# Patient Record
Sex: MALE | Race: WHITE | NOT HISPANIC OR LATINO | URBAN - METROPOLITAN AREA
[De-identification: names, ages, dates, MRNs, and addresses within clinical notes are randomized per-mention and may not be internally consistent; named-entity substitution may affect disease eponyms.]

---

## 2020-11-03 ENCOUNTER — OFFICE (OUTPATIENT)
Dept: URBAN - METROPOLITAN AREA CLINIC 12 | Facility: CLINIC | Age: 55
End: 2020-11-03

## 2020-11-03 VITALS
OXYGEN SATURATION: 95 % | SYSTOLIC BLOOD PRESSURE: 146 MMHG | WEIGHT: 315 LBS | HEIGHT: 71 IN | DIASTOLIC BLOOD PRESSURE: 88 MMHG | HEART RATE: 77 BPM

## 2020-11-03 DIAGNOSIS — B18.1 CHRONIC VIRAL HEPATITIS B WITHOUT DELTA-AGENT: ICD-10-CM

## 2020-11-03 LAB
AFP, SERUM, TUMOR MARKER: 1.8 NG/ML (ref 0–8.3)
CBC, PLATELET, NO DIFFERENTIAL: HEMATOCRIT: 42.7 % (ref 37.5–51)
CBC, PLATELET, NO DIFFERENTIAL: HEMOGLOBIN: 14.3 G/DL (ref 13–17.7)
CBC, PLATELET, NO DIFFERENTIAL: MCH: 30.6 PG (ref 26.6–33)
CBC, PLATELET, NO DIFFERENTIAL: MCHC: 33.5 G/DL (ref 31.5–35.7)
CBC, PLATELET, NO DIFFERENTIAL: MCV: 91 FL (ref 79–97)
CBC, PLATELET, NO DIFFERENTIAL: PLATELETS: 270 X10E3/UL (ref 150–450)
CBC, PLATELET, NO DIFFERENTIAL: RBC: 4.68 X10E6/UL (ref 4.14–5.8)
CBC, PLATELET, NO DIFFERENTIAL: RDW: 12.6 % (ref 11.6–15.4)
CBC, PLATELET, NO DIFFERENTIAL: WBC: 6.4 X10E3/UL (ref 3.4–10.8)
COMP. METABOLIC PANEL (14): A/G RATIO: 1.8 (ref 1.2–2.2)
COMP. METABOLIC PANEL (14): ALBUMIN: 4.6 G/DL (ref 3.8–4.9)
COMP. METABOLIC PANEL (14): ALKALINE PHOSPHATASE: 70 IU/L (ref 39–117)
COMP. METABOLIC PANEL (14): ALT (SGPT): 26 IU/L (ref 0–44)
COMP. METABOLIC PANEL (14): AST (SGOT): 21 IU/L (ref 0–40)
COMP. METABOLIC PANEL (14): BILIRUBIN, TOTAL: 0.3 MG/DL (ref 0–1.2)
COMP. METABOLIC PANEL (14): BUN/CREATININE RATIO: 16 (ref 9–20)
COMP. METABOLIC PANEL (14): BUN: 17 MG/DL (ref 6–24)
COMP. METABOLIC PANEL (14): CALCIUM: 9.5 MG/DL (ref 8.7–10.2)
COMP. METABOLIC PANEL (14): CARBON DIOXIDE, TOTAL: 24 MMOL/L (ref 20–29)
COMP. METABOLIC PANEL (14): CHLORIDE: 100 MMOL/L (ref 96–106)
COMP. METABOLIC PANEL (14): CREATININE: 1.08 MG/DL (ref 0.76–1.27)
COMP. METABOLIC PANEL (14): EGFR IF AFRICN AM: 89 ML/MIN/1.73 (ref 59–?)
COMP. METABOLIC PANEL (14): EGFR IF NONAFRICN AM: 77 ML/MIN/1.73 (ref 59–?)
COMP. METABOLIC PANEL (14): GLOBULIN, TOTAL: 2.5 G/DL (ref 1.5–4.5)
COMP. METABOLIC PANEL (14): GLUCOSE: 142 MG/DL — HIGH (ref 65–99)
COMP. METABOLIC PANEL (14): POTASSIUM: 4 MMOL/L (ref 3.5–5.2)
COMP. METABOLIC PANEL (14): PROTEIN, TOTAL: 7.1 G/DL (ref 6–8.5)
COMP. METABOLIC PANEL (14): SODIUM: 136 MMOL/L (ref 134–144)
HBSAG SCREEN: POSITIVE
HBV REAL-TIME PCR, QUANT: HBV IU/ML: 30 IU/ML
HBV REAL-TIME PCR, QUANT: LOG10 HBV IU/ML: 1.48 LOG10 IU/ML
HBV REAL-TIME PCR, QUANT: TEST INFORMATION: (no result)
HCV ANTIBODY CASCADE(PCR/GENO): HCV AB: <0.1 S/CO RATIO
HCV FIBROSURE: ALPHA 2-MACROGLOBULINS, QN: 285 MG/DL — HIGH (ref 110–276)
HCV FIBROSURE: ALT (SGPT) P5P: 29 IU/L (ref 0–55)
HCV FIBROSURE: APOLIPOPROTEIN A-1: 118 MG/DL (ref 101–178)
HCV FIBROSURE: BILIRUBIN, TOTAL: 0.2 MG/DL (ref 0–1.2)
HCV FIBROSURE: COMMENT: (no result)
HCV FIBROSURE: FIBROSIS SCORE: 0.27 — HIGH (ref 0–0.21)
HCV FIBROSURE: FIBROSIS SCORING: (no result)
HCV FIBROSURE: FIBROSIS STAGE: (no result)
HCV FIBROSURE: GGT: 29 IU/L (ref 0–65)
HCV FIBROSURE: HAPTOGLOBIN: 182 MG/DL (ref 29–370)
HCV FIBROSURE: INTERPRETATIONS: (no result)
HCV FIBROSURE: LIMITATIONS: (no result)
HCV FIBROSURE: NECROINFLAMM ACTIVITY SCORING: (no result)
HCV FIBROSURE: NECROINFLAMMAT ACTIVITY GRADE: (no result)
HCV FIBROSURE: NECROINFLAMMAT ACTIVITY SCORE: 0.13 (ref 0–0.17)
HEP A AB, TOTAL: NEGATIVE
HEP B CORE AB, IGM: NEGATIVE
HEP B CORE AB, TOT: POSITIVE
HEP B SURFACE AB: HEP B SURFACE AB, QUAL: REACTIVE
HEP BE AB: POSITIVE
HEP BE AG: NEGATIVE
HIV AG/AB WITH REFLEX: HIV SCREEN 4TH GENERATION WRFX: NON REACTIVE
INTERPRETATION: (no result)
PROTHROMBIN TIME (PT): INR: 1 (ref 0.9–1.2)
PROTHROMBIN TIME (PT): PROTHROMBIN TIME: 10.9 SEC (ref 9.1–12)

## 2020-11-03 PROCEDURE — 99204 OFFICE O/P NEW MOD 45 MIN: CPT | Performed by: INTERNAL MEDICINE

## 2020-11-03 NOTE — SERVICEHPINOTES
Mr. Valdes is a 55-year-old man who is the brother of one of my other patients who is here for evaluation of hepatitis-B. His brother has hepatitis B and hepatitis C cirrhosis and his mother  of hepatitis B cirrhosis. The patient does not recall when he was diagnosed with the disease but it appears likely that this was many years ago when he was a child or possibly at birth. He has not had evaluation for this and does not take any medication for. He denies any issues with fevers, chills, nausea, vomiting, abdominal pain, itching, or jaundice. He denies any rectal bleeding or hematemesis. He denies any problems with confusion. He denies any issues with chest pain or significant dyspnea on exertion. He does see a cardiologist on a regular basis with negative evaluation earlier this year. he has never had a screening colonoscopy. He is considered average risk for colon cancer. Blood work from his PCP in 2019 revealed normal liver enzymes.

## 2020-11-03 NOTE — SERVICENOTES
The patient has a history of hepatitis B and so we did discuss treatment options along with possible side effects and risks.  I explained there is no current cure for HBV.  We will try to get him Vemlidy once workup has been completed.  We will need to check his hepatitis A and hepatitis C status as well as HIV status.  Of note, his mother has hepatitis B alone but his brother hepatitis-B and hepatitis-C.  Given the patient's large size fibroscan may not give an accurate evaluation of his liver fibrosis so we may need to consider liver biopsy unless his labs and imaging suggest that he has cirrhosis.  Given his large size he is also at risk for fatty liver and DUNN.

## 2020-11-06 LAB
AFP, SERUM, TUMOR MARKER: 1.8 NG/ML (ref 0–8.3)
CBC, PLATELET, NO DIFFERENTIAL: HEMATOCRIT: 42.7 % (ref 37.5–51)
CBC, PLATELET, NO DIFFERENTIAL: HEMOGLOBIN: 14.3 G/DL (ref 13–17.7)
CBC, PLATELET, NO DIFFERENTIAL: MCH: 30.6 PG (ref 26.6–33)
CBC, PLATELET, NO DIFFERENTIAL: MCHC: 33.5 G/DL (ref 31.5–35.7)
CBC, PLATELET, NO DIFFERENTIAL: MCV: 91 FL (ref 79–97)
CBC, PLATELET, NO DIFFERENTIAL: NRBC: (no result)
CBC, PLATELET, NO DIFFERENTIAL: PLATELETS: 270 X10E3/UL (ref 150–450)
CBC, PLATELET, NO DIFFERENTIAL: RBC: 4.68 X10E6/UL (ref 4.14–5.8)
CBC, PLATELET, NO DIFFERENTIAL: RDW: 12.6 % (ref 11.6–15.4)
CBC, PLATELET, NO DIFFERENTIAL: WBC: 6.4 X10E3/UL (ref 3.4–10.8)
COMP. METABOLIC PANEL (14): A/G RATIO: 1.8 (ref 1.2–2.2)
COMP. METABOLIC PANEL (14): ALBUMIN: 4.6 G/DL (ref 3.8–4.9)
COMP. METABOLIC PANEL (14): ALKALINE PHOSPHATASE: 70 IU/L (ref 39–117)
COMP. METABOLIC PANEL (14): ALT (SGPT): 26 IU/L (ref 0–44)
COMP. METABOLIC PANEL (14): AST (SGOT): 21 IU/L (ref 0–40)
COMP. METABOLIC PANEL (14): BILIRUBIN, TOTAL: 0.3 MG/DL (ref 0–1.2)
COMP. METABOLIC PANEL (14): BUN/CREATININE RATIO: 16 (ref 9–20)
COMP. METABOLIC PANEL (14): BUN: 17 MG/DL (ref 6–24)
COMP. METABOLIC PANEL (14): CALCIUM: 9.5 MG/DL (ref 8.7–10.2)
COMP. METABOLIC PANEL (14): CARBON DIOXIDE, TOTAL: 24 MMOL/L (ref 20–29)
COMP. METABOLIC PANEL (14): CHLORIDE: 100 MMOL/L (ref 96–106)
COMP. METABOLIC PANEL (14): CREATININE: 1.08 MG/DL (ref 0.76–1.27)
COMP. METABOLIC PANEL (14): EGFR IF AFRICN AM: 89 ML/MIN/1.73 (ref 59–?)
COMP. METABOLIC PANEL (14): EGFR IF NONAFRICN AM: 77 ML/MIN/1.73 (ref 59–?)
COMP. METABOLIC PANEL (14): GLOBULIN, TOTAL: 2.5 G/DL (ref 1.5–4.5)
COMP. METABOLIC PANEL (14): GLUCOSE: 142 MG/DL — HIGH (ref 65–99)
COMP. METABOLIC PANEL (14): POTASSIUM: 4 MMOL/L (ref 3.5–5.2)
COMP. METABOLIC PANEL (14): PROTEIN, TOTAL: 7.1 G/DL (ref 6–8.5)
COMP. METABOLIC PANEL (14): SODIUM: 136 MMOL/L (ref 134–144)
HBSAG SCREEN: POSITIVE
HBV REAL-TIME PCR, QUANT: HBV IU/ML: 30 IU/ML
HBV REAL-TIME PCR, QUANT: LOG10 HBV IU/ML: 1.48 LOG10 IU/ML
HBV REAL-TIME PCR, QUANT: TEST INFORMATION: (no result)
HCV ANTIBODY CASCADE(PCR/GENO): HCV AB: <0.1 S/CO RATIO
HCV FIBROSURE: ALPHA 2-MACROGLOBULINS, QN: 285 MG/DL — HIGH (ref 110–276)
HCV FIBROSURE: ALT (SGPT) P5P: 29 IU/L (ref 0–55)
HCV FIBROSURE: APOLIPOPROTEIN A-1: 118 MG/DL (ref 101–178)
HCV FIBROSURE: BILIRUBIN, TOTAL: 0.2 MG/DL (ref 0–1.2)
HCV FIBROSURE: COMMENT: (no result)
HCV FIBROSURE: FIBROSIS SCORE: 0.27 — HIGH (ref 0–0.21)
HCV FIBROSURE: FIBROSIS SCORING: (no result)
HCV FIBROSURE: FIBROSIS STAGE: (no result)
HCV FIBROSURE: GGT: 29 IU/L (ref 0–65)
HCV FIBROSURE: HAPTOGLOBIN: 182 MG/DL (ref 29–370)
HCV FIBROSURE: INTERPRETATIONS: (no result)
HCV FIBROSURE: LIMITATIONS: (no result)
HCV FIBROSURE: NECROINFLAMM ACTIVITY SCORING: (no result)
HCV FIBROSURE: NECROINFLAMMAT ACTIVITY GRADE: (no result)
HCV FIBROSURE: NECROINFLAMMAT ACTIVITY SCORE: 0.13 (ref 0–0.17)
HEP A AB, TOTAL: NEGATIVE
HEP B CORE AB, IGM: NEGATIVE
HEP B CORE AB, TOT: POSITIVE
HEP B SURFACE AB: HEP B SURFACE AB, QUAL: REACTIVE
HEP BE AB: POSITIVE
HEP BE AG: NEGATIVE
HIV AG/AB WITH REFLEX: HIV SCREEN 4TH GENERATION WRFX: NON REACTIVE
INTERPRETATION: (no result)
PROTHROMBIN TIME (PT): INR: 1 (ref 0.9–1.2)
PROTHROMBIN TIME (PT): PROTHROMBIN TIME: 10.9 SEC (ref 9.1–12)

## 2020-11-24 ENCOUNTER — AMBULATORY SURGICAL CENTER (OUTPATIENT)
Dept: URBAN - METROPOLITAN AREA SURGERY 3 | Facility: SURGERY | Age: 55
End: 2020-11-24
Payer: COMMERCIAL

## 2020-11-24 ENCOUNTER — OFFICE (OUTPATIENT)
Dept: URBAN - METROPOLITAN AREA PATHOLOGY 22 | Facility: PATHOLOGY | Age: 55
End: 2020-11-24
Payer: COMMERCIAL

## 2020-11-24 VITALS
RESPIRATION RATE: 17 BRPM | HEART RATE: 75 BPM | SYSTOLIC BLOOD PRESSURE: 138 MMHG | SYSTOLIC BLOOD PRESSURE: 129 MMHG | HEIGHT: 71 IN | SYSTOLIC BLOOD PRESSURE: 112 MMHG | HEART RATE: 88 BPM | HEART RATE: 75 BPM | HEART RATE: 74 BPM | OXYGEN SATURATION: 97 % | WEIGHT: 315 LBS | OXYGEN SATURATION: 96 % | RESPIRATION RATE: 17 BRPM | TEMPERATURE: 97.4 F | DIASTOLIC BLOOD PRESSURE: 74 MMHG | DIASTOLIC BLOOD PRESSURE: 94 MMHG | SYSTOLIC BLOOD PRESSURE: 127 MMHG | OXYGEN SATURATION: 93 % | DIASTOLIC BLOOD PRESSURE: 94 MMHG | SYSTOLIC BLOOD PRESSURE: 129 MMHG | DIASTOLIC BLOOD PRESSURE: 75 MMHG | HEART RATE: 71 BPM | DIASTOLIC BLOOD PRESSURE: 67 MMHG | SYSTOLIC BLOOD PRESSURE: 112 MMHG | OXYGEN SATURATION: 98 % | DIASTOLIC BLOOD PRESSURE: 87 MMHG | SYSTOLIC BLOOD PRESSURE: 169 MMHG | RESPIRATION RATE: 18 BRPM | RESPIRATION RATE: 24 BRPM | SYSTOLIC BLOOD PRESSURE: 169 MMHG | RESPIRATION RATE: 18 BRPM | OXYGEN SATURATION: 98 % | DIASTOLIC BLOOD PRESSURE: 74 MMHG | OXYGEN SATURATION: 96 % | OXYGEN SATURATION: 97 % | OXYGEN SATURATION: 100 % | DIASTOLIC BLOOD PRESSURE: 87 MMHG | DIASTOLIC BLOOD PRESSURE: 67 MMHG | DIASTOLIC BLOOD PRESSURE: 75 MMHG | OXYGEN SATURATION: 93 % | OXYGEN SATURATION: 100 % | RESPIRATION RATE: 24 BRPM | RESPIRATION RATE: 16 BRPM | HEART RATE: 74 BPM | SYSTOLIC BLOOD PRESSURE: 138 MMHG | SYSTOLIC BLOOD PRESSURE: 127 MMHG | WEIGHT: 315 LBS | TEMPERATURE: 97.4 F | HEIGHT: 71 IN | HEART RATE: 88 BPM | RESPIRATION RATE: 16 BRPM | HEART RATE: 71 BPM

## 2020-11-24 DIAGNOSIS — Z12.11 ENCOUNTER FOR SCREENING FOR MALIGNANT NEOPLASM OF COLON: ICD-10-CM

## 2020-11-24 DIAGNOSIS — K64.0 FIRST DEGREE HEMORRHOIDS: ICD-10-CM

## 2020-11-24 DIAGNOSIS — K57.30 DIVERTICULOSIS OF LARGE INTESTINE WITHOUT PERFORATION OR ABS: ICD-10-CM

## 2020-11-24 DIAGNOSIS — D12.2 BENIGN NEOPLASM OF ASCENDING COLON: ICD-10-CM

## 2020-11-24 DIAGNOSIS — K63.5 POLYP OF COLON: ICD-10-CM

## 2020-11-24 PROCEDURE — 88305 TISSUE EXAM BY PATHOLOGIST: CPT | Performed by: INTERNAL MEDICINE

## 2020-11-24 PROCEDURE — 45385 COLONOSCOPY W/LESION REMOVAL: CPT | Mod: 33 | Performed by: INTERNAL MEDICINE

## 2020-11-24 PROCEDURE — 45380 COLONOSCOPY AND BIOPSY: CPT | Mod: 59 | Performed by: INTERNAL MEDICINE

## 2020-12-11 ENCOUNTER — OFFICE (OUTPATIENT)
Dept: URBAN - METROPOLITAN AREA CLINIC 14 | Facility: CLINIC | Age: 55
End: 2020-12-11

## 2020-12-11 VITALS — HEIGHT: 71 IN

## 2020-12-11 DIAGNOSIS — B18.1 CHRONIC VIRAL HEPATITIS B WITHOUT DELTA-AGENT: ICD-10-CM

## 2020-12-11 PROCEDURE — 91200 LIVER ELASTOGRAPHY: CPT | Performed by: INTERNAL MEDICINE

## 2021-03-09 ENCOUNTER — OFFICE (OUTPATIENT)
Dept: URBAN - METROPOLITAN AREA CLINIC 12 | Facility: CLINIC | Age: 56
End: 2021-03-09

## 2021-03-09 VITALS
HEART RATE: 69 BPM | HEIGHT: 71 IN | WEIGHT: 315 LBS | DIASTOLIC BLOOD PRESSURE: 90 MMHG | SYSTOLIC BLOOD PRESSURE: 158 MMHG

## 2021-03-09 DIAGNOSIS — K63.5 POLYP OF COLON: ICD-10-CM

## 2021-03-09 DIAGNOSIS — B18.1 CHRONIC VIRAL HEPATITIS B WITHOUT DELTA-AGENT: ICD-10-CM

## 2021-03-09 LAB
AFP, SERUM, TUMOR MARKER: 1.9 NG/ML (ref 0–8.3)
CBC, PLATELET, NO DIFFERENTIAL: HEMATOCRIT: 42.8 % (ref 37.5–51)
CBC, PLATELET, NO DIFFERENTIAL: HEMOGLOBIN: 13.7 G/DL (ref 13–17.7)
CBC, PLATELET, NO DIFFERENTIAL: MCH: 29.8 PG (ref 26.6–33)
CBC, PLATELET, NO DIFFERENTIAL: MCHC: 32 G/DL (ref 31.5–35.7)
CBC, PLATELET, NO DIFFERENTIAL: MCV: 93 FL (ref 79–97)
CBC, PLATELET, NO DIFFERENTIAL: PLATELETS: 262 X10E3/UL (ref 150–450)
CBC, PLATELET, NO DIFFERENTIAL: RBC: 4.6 X10E6/UL (ref 4.14–5.8)
CBC, PLATELET, NO DIFFERENTIAL: RDW: 13.2 % (ref 11.6–15.4)
CBC, PLATELET, NO DIFFERENTIAL: WBC: 6 X10E3/UL (ref 3.4–10.8)
COMP. METABOLIC PANEL (14): A/G RATIO: 2.2 (ref 1.2–2.2)
COMP. METABOLIC PANEL (14): ALBUMIN: 4.9 G/DL (ref 3.8–4.9)
COMP. METABOLIC PANEL (14): ALKALINE PHOSPHATASE: 71 IU/L (ref 39–117)
COMP. METABOLIC PANEL (14): ALT (SGPT): 34 IU/L (ref 0–44)
COMP. METABOLIC PANEL (14): AST (SGOT): 24 IU/L (ref 0–40)
COMP. METABOLIC PANEL (14): BILIRUBIN, TOTAL: 0.2 MG/DL (ref 0–1.2)
COMP. METABOLIC PANEL (14): BUN/CREATININE RATIO: 17 (ref 9–20)
COMP. METABOLIC PANEL (14): BUN: 18 MG/DL (ref 6–24)
COMP. METABOLIC PANEL (14): CALCIUM: 9.6 MG/DL (ref 8.7–10.2)
COMP. METABOLIC PANEL (14): CARBON DIOXIDE, TOTAL: 26 MMOL/L (ref 20–29)
COMP. METABOLIC PANEL (14): CHLORIDE: 99 MMOL/L (ref 96–106)
COMP. METABOLIC PANEL (14): CREATININE: 1.09 MG/DL (ref 0.76–1.27)
COMP. METABOLIC PANEL (14): EGFR IF AFRICN AM: 88 ML/MIN/1.73 (ref 59–?)
COMP. METABOLIC PANEL (14): EGFR IF NONAFRICN AM: 77 ML/MIN/1.73 (ref 59–?)
COMP. METABOLIC PANEL (14): GLOBULIN, TOTAL: 2.2 G/DL (ref 1.5–4.5)
COMP. METABOLIC PANEL (14): GLUCOSE: 141 MG/DL — HIGH (ref 65–99)
COMP. METABOLIC PANEL (14): POTASSIUM: 4.5 MMOL/L (ref 3.5–5.2)
COMP. METABOLIC PANEL (14): PROTEIN, TOTAL: 7.1 G/DL (ref 6–8.5)
COMP. METABOLIC PANEL (14): SODIUM: 138 MMOL/L (ref 134–144)
PROTHROMBIN TIME (PT): INR: 1 (ref 0.9–1.2)
PROTHROMBIN TIME (PT): PROTHROMBIN TIME: 10.7 SEC (ref 9.1–12)

## 2021-03-09 PROCEDURE — 99214 OFFICE O/P EST MOD 30 MIN: CPT | Performed by: INTERNAL MEDICINE

## 2021-09-14 ENCOUNTER — OFFICE (OUTPATIENT)
Dept: URBAN - METROPOLITAN AREA CLINIC 12 | Facility: CLINIC | Age: 56
End: 2021-09-14

## 2021-09-14 VITALS
HEIGHT: 71 IN | SYSTOLIC BLOOD PRESSURE: 151 MMHG | DIASTOLIC BLOOD PRESSURE: 86 MMHG | WEIGHT: 315 LBS | HEART RATE: 76 BPM | OXYGEN SATURATION: 95 %

## 2021-09-14 DIAGNOSIS — B18.1 CHRONIC VIRAL HEPATITIS B WITHOUT DELTA-AGENT: ICD-10-CM

## 2021-09-14 DIAGNOSIS — K63.5 POLYP OF COLON: ICD-10-CM

## 2021-09-14 LAB
AFP, SERUM, TUMOR MARKER: 1.7 NG/ML (ref 0–8.3)
CBC, PLATELET, NO DIFFERENTIAL: HEMATOCRIT: 40.8 % (ref 37.5–51)
CBC, PLATELET, NO DIFFERENTIAL: HEMOGLOBIN: 13.4 G/DL (ref 13–17.7)
CBC, PLATELET, NO DIFFERENTIAL: MCH: 30.4 PG (ref 26.6–33)
CBC, PLATELET, NO DIFFERENTIAL: MCHC: 32.8 G/DL (ref 31.5–35.7)
CBC, PLATELET, NO DIFFERENTIAL: MCV: 93 FL (ref 79–97)
CBC, PLATELET, NO DIFFERENTIAL: PLATELETS: 227 X10E3/UL (ref 150–450)
CBC, PLATELET, NO DIFFERENTIAL: RBC: 4.41 X10E6/UL (ref 4.14–5.8)
CBC, PLATELET, NO DIFFERENTIAL: RDW: 13 % (ref 11.6–15.4)
CBC, PLATELET, NO DIFFERENTIAL: WBC: 4.7 X10E3/UL (ref 3.4–10.8)
COMP. METABOLIC PANEL (14): A/G RATIO: 1.7 (ref 1.2–2.2)
COMP. METABOLIC PANEL (14): ALBUMIN: 4.5 G/DL (ref 3.8–4.9)
COMP. METABOLIC PANEL (14): ALKALINE PHOSPHATASE: 73 IU/L (ref 44–121)
COMP. METABOLIC PANEL (14): ALT (SGPT): 34 IU/L (ref 0–44)
COMP. METABOLIC PANEL (14): AST (SGOT): 25 IU/L (ref 0–40)
COMP. METABOLIC PANEL (14): BILIRUBIN, TOTAL: 0.2 MG/DL (ref 0–1.2)
COMP. METABOLIC PANEL (14): BUN/CREATININE RATIO: 17 (ref 9–20)
COMP. METABOLIC PANEL (14): BUN: 19 MG/DL (ref 6–24)
COMP. METABOLIC PANEL (14): CALCIUM: 9.3 MG/DL (ref 8.7–10.2)
COMP. METABOLIC PANEL (14): CARBON DIOXIDE, TOTAL: 22 MMOL/L (ref 20–29)
COMP. METABOLIC PANEL (14): CHLORIDE: 101 MMOL/L (ref 96–106)
COMP. METABOLIC PANEL (14): CREATININE: 1.09 MG/DL (ref 0.76–1.27)
COMP. METABOLIC PANEL (14): EGFR IF AFRICN AM: 88 ML/MIN/1.73 (ref 59–?)
COMP. METABOLIC PANEL (14): EGFR IF NONAFRICN AM: 76 ML/MIN/1.73 (ref 59–?)
COMP. METABOLIC PANEL (14): GLOBULIN, TOTAL: 2.6 G/DL (ref 1.5–4.5)
COMP. METABOLIC PANEL (14): GLUCOSE: 238 MG/DL — HIGH (ref 65–99)
COMP. METABOLIC PANEL (14): POTASSIUM: 4.1 MMOL/L (ref 3.5–5.2)
COMP. METABOLIC PANEL (14): PROTEIN, TOTAL: 7.1 G/DL (ref 6–8.5)
COMP. METABOLIC PANEL (14): SODIUM: 139 MMOL/L (ref 134–144)
PROTHROMBIN TIME (PT): INR: 1 (ref 0.9–1.2)
PROTHROMBIN TIME (PT): PROTHROMBIN TIME: 10.3 SEC (ref 9.1–12)

## 2021-09-14 PROCEDURE — 99213 OFFICE O/P EST LOW 20 MIN: CPT | Performed by: INTERNAL MEDICINE

## 2021-09-14 NOTE — SERVICENOTES
Unfortunately we do not have the hepatitis A vaccine series at our clinic today.  The patient states that he will go to his local pharmacy to get the vaccine.

## 2021-09-14 NOTE — SERVICEHPINOTES
Mr. Valdes is a 56-year-old man who is the brother of one of my other patients who is here for follow up of hepatitis-B. Overall he continues to do well. He denies any fevers, chills, nausea, vomiting, or abdominal pain. He has not had any jaundice or skin itching. Unfortunately, he was unable to get the hepatitis a vaccine series yet. He has not had any problems with constipation, diarrhea, or rectal bleeding. Recent liver ultrasound was unremarkable. Previous GI History:NAHUMHis brother has hepatitis B and hepatitis C cirrhosis and his mother  of hepatitis B cirrhosis. The patient does not recall when he was diagnosed with the disease but it appears likely that this was many years ago when he was a child or possibly at birth. He was initially seen by us in 2020 and that time lab work revealed normal transaminases with AST 21 and ALT 26. CBC and INR were normal. His hepatitis-B viral load was minimal at 30 IU/mL. Hepatitis-C antibody was negative. HIV was negative. Fibro scan and fibro sure were both consistent F0-F1 fibrosis but did also show evidence of steatosis. Because of low fibrosis, minimal viral load, and normal liver enzymes treatment was not indicated. The patient did undergo screening colonoscopy in 2020 with removal of a small adenomatous polyp and so his next colonoscopy for surveillance is due in .

## 2022-03-22 ENCOUNTER — OFFICE (OUTPATIENT)
Dept: URBAN - METROPOLITAN AREA CLINIC 12 | Facility: CLINIC | Age: 57
End: 2022-03-22

## 2022-03-22 VITALS
HEART RATE: 67 BPM | DIASTOLIC BLOOD PRESSURE: 86 MMHG | SYSTOLIC BLOOD PRESSURE: 147 MMHG | HEIGHT: 71 IN | OXYGEN SATURATION: 94 % | WEIGHT: 315 LBS

## 2022-03-22 DIAGNOSIS — B18.1 CHRONIC VIRAL HEPATITIS B WITHOUT DELTA-AGENT: ICD-10-CM

## 2022-03-22 LAB
AFP, SERUM, TUMOR MARKER: 2.4 NG/ML (ref 0–8.4)
CBC, PLATELET, NO DIFFERENTIAL: HEMATOCRIT: 43.2 % (ref 37.5–51)
CBC, PLATELET, NO DIFFERENTIAL: HEMOGLOBIN: 14 G/DL (ref 13–17.7)
CBC, PLATELET, NO DIFFERENTIAL: MCH: 30.4 PG (ref 26.6–33)
CBC, PLATELET, NO DIFFERENTIAL: MCHC: 32.4 G/DL (ref 31.5–35.7)
CBC, PLATELET, NO DIFFERENTIAL: MCV: 94 FL (ref 79–97)
CBC, PLATELET, NO DIFFERENTIAL: PLATELETS: 234 X10E3/UL (ref 150–450)
CBC, PLATELET, NO DIFFERENTIAL: RBC: 4.61 X10E6/UL (ref 4.14–5.8)
CBC, PLATELET, NO DIFFERENTIAL: RDW: 12.7 % (ref 11.6–15.4)
CBC, PLATELET, NO DIFFERENTIAL: WBC: 4.8 X10E3/UL (ref 3.4–10.8)
COMP. METABOLIC PANEL (14): A/G RATIO: 2 (ref 1.2–2.2)
COMP. METABOLIC PANEL (14): ALBUMIN: 4.5 G/DL (ref 3.8–4.9)
COMP. METABOLIC PANEL (14): ALKALINE PHOSPHATASE: 90 IU/L (ref 44–121)
COMP. METABOLIC PANEL (14): ALT (SGPT): 34 IU/L (ref 0–44)
COMP. METABOLIC PANEL (14): AST (SGOT): 26 IU/L (ref 0–40)
COMP. METABOLIC PANEL (14): BILIRUBIN, TOTAL: 0.3 MG/DL (ref 0–1.2)
COMP. METABOLIC PANEL (14): BUN/CREATININE RATIO: 14 (ref 9–20)
COMP. METABOLIC PANEL (14): BUN: 17 MG/DL (ref 6–24)
COMP. METABOLIC PANEL (14): CALCIUM: 9.6 MG/DL (ref 8.7–10.2)
COMP. METABOLIC PANEL (14): CARBON DIOXIDE, TOTAL: 22 MMOL/L (ref 20–29)
COMP. METABOLIC PANEL (14): CHLORIDE: 101 MMOL/L (ref 96–106)
COMP. METABOLIC PANEL (14): CREATININE: 1.2 MG/DL (ref 0.76–1.27)
COMP. METABOLIC PANEL (14): EGFR: 71 ML/MIN/1.73 (ref 59–?)
COMP. METABOLIC PANEL (14): GLOBULIN, TOTAL: 2.3 G/DL (ref 1.5–4.5)
COMP. METABOLIC PANEL (14): GLUCOSE: 302 MG/DL — HIGH (ref 65–99)
COMP. METABOLIC PANEL (14): POTASSIUM: 4.9 MMOL/L (ref 3.5–5.2)
COMP. METABOLIC PANEL (14): PROTEIN, TOTAL: 6.8 G/DL (ref 6–8.5)
COMP. METABOLIC PANEL (14): SODIUM: 140 MMOL/L (ref 134–144)
HBV REAL-TIME PCR, QUANT: HBV IU/ML: 30 IU/ML
HBV REAL-TIME PCR, QUANT: LOG10 HBV IU/ML: 1.48 LOG10 IU/ML
HBV REAL-TIME PCR, QUANT: TEST INFORMATION: (no result)
PROTHROMBIN TIME (PT): INR: 1 (ref 0.9–1.2)
PROTHROMBIN TIME (PT): PROTHROMBIN TIME: 10.6 SEC (ref 9.1–12)

## 2022-03-22 PROCEDURE — 99213 OFFICE O/P EST LOW 20 MIN: CPT

## 2022-03-22 NOTE — SERVICENOTES
MD Addendum: The patient was seen along with Trudy Martel NP.  I have evaluated the patient, discussed with NP, and agree with the above documented findings, impression, and plan of care. Overall he is doing well. At this point he does not meet criteria for treatment given low viral load, normal transaminases, and low fibrosis on fibroscan.  We did have a discussion regarding the need for continued work on weight loss, etc.  His next colonoscopy is due in 2027.-ANT

## 2022-03-22 NOTE — SERVICEHPINOTES
Mr. Yuan is a 57 yr old male who presents today for follow up for Hepatitis B. Overall, he is doing well. He had a liver US on  which revealed hepatic steatosis with no liver masses. His last bloodwork from 2021 revealed normal liver enzymes. He currently denies any abdominal pain, ascites, jaundice or pruritus. He currently will have one formed BM a day with no straining, rectal pain or bleeding. He recently started his Hepatitis A vaccine series. He will receive his last shot at the beginning of May. He currently denies any fever, chills, nausea, vomiting, diarrhea, constipation, abdominal pain or rectal bleeding. He is due for his next colonoscopy in .  
br
brPrevious GI History:brHis brother has hepatitis B and hepatitis C cirrhosis and his mother  of hepatitis B cirrhosis. The patient does not recall when he was diagnosed with the disease but it appears likely that this was many years ago when he was a child or possibly at birth. He was initially seen by us in 2020 and that time lab work revealed normal transaminases with AST 21 and ALT 26. CBC and INR were normal. His hepatitis-B viral load was minimal at 30 IU/mL. Hepatitis-C antibody was negative. HIV was negative. Fibro scan and fibro sure were both consistent F0-F1 fibrosis but did also show evidence of steatosis. Because of low fibrosis, minimal viral load, and normal liver enzymes treatment was not indicated. The patient did undergo screening colonoscopy in 2020 with removal of a small adenomatous polyp and so his next colonoscopy for surveillance is due in .

## 2022-12-08 ENCOUNTER — OFFICE (OUTPATIENT)
Dept: URBAN - METROPOLITAN AREA CLINIC 14 | Facility: CLINIC | Age: 57
End: 2022-12-08

## 2022-12-08 VITALS
HEART RATE: 69 BPM | OXYGEN SATURATION: 95 % | SYSTOLIC BLOOD PRESSURE: 167 MMHG | RESPIRATION RATE: 18 BRPM | WEIGHT: 315 LBS | DIASTOLIC BLOOD PRESSURE: 92 MMHG | HEIGHT: 71 IN

## 2022-12-08 DIAGNOSIS — B18.1 CHRONIC VIRAL HEPATITIS B WITHOUT DELTA-AGENT: ICD-10-CM

## 2022-12-08 PROCEDURE — 99214 OFFICE O/P EST MOD 30 MIN: CPT | Performed by: INTERNAL MEDICINE

## 2022-12-08 NOTE — SERVICEHPINOTES
Mr. Yuan is a 57 yr old male who presents today for follow up for Hepatitis B. The patient continues to do very well. He denies any issues with fevers, chills, nausea, vomiting, abdominal pain, diarrhea, constipation, or jaundice. His blood work has been good and his hepatitis-B viral load has been minimal with normal liver enzymes so he has not been on any treatment per protocol.Previous GI History:brHis brother has hepatitis B and hepatitis C cirrhosis and his mother  of hepatitis B cirrhosis. The patient does not recall when he was diagnosed with the disease but it appears likely that this was many years ago when he was a child or possibly at birth. He was initially seen by us in 2020 and that time lab work revealed normal transaminases with AST 21 and ALT 26. CBC and INR were normal. His hepatitis-B viral load was minimal at 30 IU/mL. Hepatitis-C antibody was negative. HIV was negative. Fibro scan and fibro sure were both consistent F0-F1 fibrosis but did also show evidence of steatosis. Because of low fibrosis, minimal viral load, and normal liver enzymes treatment was not indicated. The patient did undergo screening colonoscopy in 2020 with removal of a small adenomatous polyp and so his next colonoscopy for surveillance is due in .

## 2022-12-08 NOTE — SERVICENOTES
We will check basic labs as above.  The patient is interested in our tumor marker research study so we will refer him.  He was instructed to notify us if he does not enroll or if he does not get in touch with them at which point we need to go ahead and schedule the patient for his six month liver ultrasound and AFP.  Since his viral load has remained low and liver enzymes have been normal we will continue to hold off on treatment but will reassess with pending lab results.

## 2022-12-09 LAB
CBC, PLATELET, NO DIFFERENTIAL: HEMATOCRIT: 45.3 % (ref 37.5–51)
CBC, PLATELET, NO DIFFERENTIAL: HEMOGLOBIN: 15 G/DL (ref 13–17.7)
CBC, PLATELET, NO DIFFERENTIAL: MCH: 30.5 PG (ref 26.6–33)
CBC, PLATELET, NO DIFFERENTIAL: MCHC: 33.1 G/DL (ref 31.5–35.7)
CBC, PLATELET, NO DIFFERENTIAL: MCV: 92 FL (ref 79–97)
CBC, PLATELET, NO DIFFERENTIAL: NRBC: (no result)
CBC, PLATELET, NO DIFFERENTIAL: PLATELETS: 269 X10E3/UL (ref 150–450)
CBC, PLATELET, NO DIFFERENTIAL: RBC: 4.92 X10E6/UL (ref 4.14–5.8)
CBC, PLATELET, NO DIFFERENTIAL: RDW: 12.7 % (ref 11.6–15.4)
CBC, PLATELET, NO DIFFERENTIAL: WBC: 6.9 X10E3/UL (ref 3.4–10.8)
PROTHROMBIN TIME (PT): INR: 1 (ref 0.9–1.2)
PROTHROMBIN TIME (PT): PROTHROMBIN TIME: 10.4 SEC (ref 9.1–12)

## 2022-12-11 LAB
COMP. METABOLIC PANEL (14): A/G RATIO: 1.5 (ref 1.2–2.2)
COMP. METABOLIC PANEL (14): ALBUMIN: 4.5 G/DL (ref 3.8–4.9)
COMP. METABOLIC PANEL (14): ALKALINE PHOSPHATASE: 93 IU/L (ref 44–121)
COMP. METABOLIC PANEL (14): ALT (SGPT): 37 IU/L (ref 0–44)
COMP. METABOLIC PANEL (14): AST (SGOT): 26 IU/L (ref 0–40)
COMP. METABOLIC PANEL (14): BILIRUBIN, TOTAL: <0.2 MG/DL
COMP. METABOLIC PANEL (14): BUN/CREATININE RATIO: 16 (ref 9–20)
COMP. METABOLIC PANEL (14): BUN: 18 MG/DL (ref 6–24)
COMP. METABOLIC PANEL (14): CALCIUM: 10 MG/DL (ref 8.7–10.2)
COMP. METABOLIC PANEL (14): CARBON DIOXIDE, TOTAL: 20 MMOL/L (ref 20–29)
COMP. METABOLIC PANEL (14): CHLORIDE: 100 MMOL/L (ref 96–106)
COMP. METABOLIC PANEL (14): CREATININE: 1.13 MG/DL (ref 0.76–1.27)
COMP. METABOLIC PANEL (14): EGFR: 76 ML/MIN/1.73 (ref 59–?)
COMP. METABOLIC PANEL (14): GLOBULIN, TOTAL: 3.1 G/DL (ref 1.5–4.5)
COMP. METABOLIC PANEL (14): GLUCOSE: 268 MG/DL — HIGH (ref 70–99)
COMP. METABOLIC PANEL (14): POTASSIUM: 4.3 MMOL/L (ref 3.5–5.2)
COMP. METABOLIC PANEL (14): PROTEIN, TOTAL: 7.6 G/DL (ref 6–8.5)
COMP. METABOLIC PANEL (14): SODIUM: 141 MMOL/L (ref 134–144)
HBV REAL-TIME PCR, QUANT: HBV IU/ML: 100 IU/ML
HBV REAL-TIME PCR, QUANT: LOG10 HBV IU/ML: 2 LOG10 IU/ML
HBV REAL-TIME PCR, QUANT: TEST INFORMATION: (no result)

## 2023-01-18 ENCOUNTER — OFFICE (OUTPATIENT)
Dept: URBAN - METROPOLITAN AREA CLINIC 19 | Facility: CLINIC | Age: 58
End: 2023-01-18
Payer: COMMERCIAL

## 2023-01-18 DIAGNOSIS — B18.1 CHRONIC VIRAL HEPATITIS B WITHOUT DELTA-AGENT: ICD-10-CM

## 2023-01-18 PROCEDURE — 76700 US EXAM ABDOM COMPLETE: CPT | Mod: TC | Performed by: NURSE PRACTITIONER

## 2023-06-13 ENCOUNTER — OFFICE (OUTPATIENT)
Dept: URBAN - METROPOLITAN AREA CLINIC 12 | Facility: CLINIC | Age: 58
End: 2023-06-13
Payer: COMMERCIAL

## 2023-06-13 VITALS
WEIGHT: 315 LBS | HEIGHT: 71 IN | HEART RATE: 67 BPM | DIASTOLIC BLOOD PRESSURE: 90 MMHG | OXYGEN SATURATION: 95 % | SYSTOLIC BLOOD PRESSURE: 154 MMHG

## 2023-06-13 DIAGNOSIS — K76.0 FATTY (CHANGE OF) LIVER, NOT ELSEWHERE CLASSIFIED: ICD-10-CM

## 2023-06-13 DIAGNOSIS — B18.1 CHRONIC VIRAL HEPATITIS B WITHOUT DELTA-AGENT: ICD-10-CM

## 2023-06-13 PROCEDURE — 99214 OFFICE O/P EST MOD 30 MIN: CPT | Performed by: INTERNAL MEDICINE

## 2023-06-14 LAB
CBC, PLATELET, NO DIFFERENTIAL: HEMATOCRIT: 47.2 % (ref 37.5–51)
CBC, PLATELET, NO DIFFERENTIAL: HEMOGLOBIN: 15.7 G/DL (ref 13–17.7)
CBC, PLATELET, NO DIFFERENTIAL: MCH: 30.9 PG (ref 26.6–33)
CBC, PLATELET, NO DIFFERENTIAL: MCHC: 33.3 G/DL (ref 31.5–35.7)
CBC, PLATELET, NO DIFFERENTIAL: MCV: 93 FL (ref 79–97)
CBC, PLATELET, NO DIFFERENTIAL: PLATELETS: 246 X10E3/UL (ref 150–450)
CBC, PLATELET, NO DIFFERENTIAL: RBC: 5.08 X10E6/UL (ref 4.14–5.8)
CBC, PLATELET, NO DIFFERENTIAL: RDW: 12.2 % (ref 11.6–15.4)
CBC, PLATELET, NO DIFFERENTIAL: WBC: 6 X10E3/UL (ref 3.4–10.8)
COMP. METABOLIC PANEL (14): A/G RATIO: 1.6 (ref 1.2–2.2)
COMP. METABOLIC PANEL (14): ALBUMIN: 4.5 G/DL (ref 3.8–4.9)
COMP. METABOLIC PANEL (14): ALKALINE PHOSPHATASE: 87 IU/L (ref 44–121)
COMP. METABOLIC PANEL (14): ALT (SGPT): 42 IU/L (ref 0–44)
COMP. METABOLIC PANEL (14): AST (SGOT): 41 IU/L — HIGH (ref 0–40)
COMP. METABOLIC PANEL (14): BILIRUBIN, TOTAL: 0.4 MG/DL (ref 0–1.2)
COMP. METABOLIC PANEL (14): BUN/CREATININE RATIO: 16 (ref 9–20)
COMP. METABOLIC PANEL (14): BUN: 17 MG/DL (ref 6–24)
COMP. METABOLIC PANEL (14): CALCIUM: 9.7 MG/DL (ref 8.7–10.2)
COMP. METABOLIC PANEL (14): CARBON DIOXIDE, TOTAL: 20 MMOL/L (ref 20–29)
COMP. METABOLIC PANEL (14): CHLORIDE: 97 MMOL/L (ref 96–106)
COMP. METABOLIC PANEL (14): CREATININE: 1.04 MG/DL (ref 0.76–1.27)
COMP. METABOLIC PANEL (14): EGFR: 84 ML/MIN/1.73 (ref 59–?)
COMP. METABOLIC PANEL (14): GLOBULIN, TOTAL: 2.8 G/DL (ref 1.5–4.5)
COMP. METABOLIC PANEL (14): GLUCOSE: 295 MG/DL — HIGH (ref 70–99)
COMP. METABOLIC PANEL (14): POTASSIUM: 4.7 MMOL/L (ref 3.5–5.2)
COMP. METABOLIC PANEL (14): PROTEIN, TOTAL: 7.3 G/DL (ref 6–8.5)
COMP. METABOLIC PANEL (14): SODIUM: 136 MMOL/L (ref 134–144)
HBV REAL-TIME PCR, QUANT: HBV IU/ML: 30 IU/ML
HBV REAL-TIME PCR, QUANT: LOG10 HBV IU/ML: 1.48 LOG10 IU/ML
HBV REAL-TIME PCR, QUANT: TEST INFORMATION: (no result)
PROTHROMBIN TIME (PT): INR: 1 (ref 0.9–1.2)
PROTHROMBIN TIME (PT): PROTHROMBIN TIME: 10.6 SEC (ref 9.1–12)

## 2023-08-08 ENCOUNTER — OFFICE (OUTPATIENT)
Dept: URBAN - METROPOLITAN AREA CLINIC 19 | Facility: CLINIC | Age: 58
End: 2023-08-08
Payer: COMMERCIAL

## 2023-08-08 DIAGNOSIS — B18.1 CHRONIC VIRAL HEPATITIS B WITHOUT DELTA-AGENT: ICD-10-CM

## 2023-08-08 PROCEDURE — 76700 US EXAM ABDOM COMPLETE: CPT | Mod: TC | Performed by: NURSE PRACTITIONER

## 2023-08-08 PROCEDURE — 76700 US EXAM ABDOM COMPLETE: CPT | Mod: TC | Performed by: INTERNAL MEDICINE

## 2023-12-19 ENCOUNTER — OFFICE (OUTPATIENT)
Dept: URBAN - METROPOLITAN AREA CLINIC 12 | Facility: CLINIC | Age: 58
End: 2023-12-19

## 2023-12-19 VITALS
DIASTOLIC BLOOD PRESSURE: 78 MMHG | HEART RATE: 68 BPM | WEIGHT: 315 LBS | HEIGHT: 71 IN | OXYGEN SATURATION: 97 % | SYSTOLIC BLOOD PRESSURE: 140 MMHG

## 2023-12-19 DIAGNOSIS — B18.1 CHRONIC VIRAL HEPATITIS B WITHOUT DELTA-AGENT: ICD-10-CM

## 2023-12-19 DIAGNOSIS — K76.0 FATTY (CHANGE OF) LIVER, NOT ELSEWHERE CLASSIFIED: ICD-10-CM

## 2023-12-19 PROCEDURE — 99214 OFFICE O/P EST MOD 30 MIN: CPT | Performed by: INTERNAL MEDICINE

## 2023-12-20 LAB
CBC, PLATELET, NO DIFFERENTIAL: HEMATOCRIT: 43.1 % (ref 37.5–51)
CBC, PLATELET, NO DIFFERENTIAL: HEMOGLOBIN: 14 G/DL (ref 13–17.7)
CBC, PLATELET, NO DIFFERENTIAL: MCH: 30.9 PG (ref 26.6–33)
CBC, PLATELET, NO DIFFERENTIAL: MCHC: 32.5 G/DL (ref 31.5–35.7)
CBC, PLATELET, NO DIFFERENTIAL: MCV: 95 FL (ref 79–97)
CBC, PLATELET, NO DIFFERENTIAL: PLATELETS: 265 X10E3/UL (ref 150–450)
CBC, PLATELET, NO DIFFERENTIAL: RBC: 4.53 X10E6/UL (ref 4.14–5.8)
CBC, PLATELET, NO DIFFERENTIAL: RDW: 12 % (ref 11.6–15.4)
CBC, PLATELET, NO DIFFERENTIAL: WBC: 6.1 X10E3/UL (ref 3.4–10.8)
COMP. METABOLIC PANEL (14): A/G RATIO: 1.7 (ref 1.2–2.2)
COMP. METABOLIC PANEL (14): ALBUMIN: 4.3 G/DL (ref 3.8–4.9)
COMP. METABOLIC PANEL (14): ALKALINE PHOSPHATASE: 64 IU/L (ref 44–121)
COMP. METABOLIC PANEL (14): ALT (SGPT): 17 IU/L (ref 0–44)
COMP. METABOLIC PANEL (14): AST (SGOT): 15 IU/L (ref 0–40)
COMP. METABOLIC PANEL (14): BILIRUBIN, TOTAL: 0.2 MG/DL (ref 0–1.2)
COMP. METABOLIC PANEL (14): BUN/CREATININE RATIO: 16 (ref 9–20)
COMP. METABOLIC PANEL (14): BUN: 22 MG/DL (ref 6–24)
COMP. METABOLIC PANEL (14): CALCIUM: 10 MG/DL (ref 8.7–10.2)
COMP. METABOLIC PANEL (14): CARBON DIOXIDE, TOTAL: 23 MMOL/L (ref 20–29)
COMP. METABOLIC PANEL (14): CHLORIDE: 101 MMOL/L (ref 96–106)
COMP. METABOLIC PANEL (14): CREATININE: 1.35 MG/DL — HIGH (ref 0.76–1.27)
COMP. METABOLIC PANEL (14): EGFR: 61 ML/MIN/1.73 (ref 59–?)
COMP. METABOLIC PANEL (14): GLOBULIN, TOTAL: 2.6 G/DL (ref 1.5–4.5)
COMP. METABOLIC PANEL (14): GLUCOSE: 156 MG/DL — HIGH (ref 70–99)
COMP. METABOLIC PANEL (14): POTASSIUM: 4.4 MMOL/L (ref 3.5–5.2)
COMP. METABOLIC PANEL (14): PROTEIN, TOTAL: 6.9 G/DL (ref 6–8.5)
COMP. METABOLIC PANEL (14): SODIUM: 139 MMOL/L (ref 134–144)
HBV REAL-TIME PCR, QUANT: HBV IU/ML: <10 IU/ML
HBV REAL-TIME PCR, QUANT: LOG10 HBV IU/ML: (no result) LOG10 IU/ML
HBV REAL-TIME PCR, QUANT: TEST INFORMATION: (no result)
PROTHROMBIN TIME (PT): INR: 1.1 (ref 0.9–1.2)
PROTHROMBIN TIME (PT): PROTHROMBIN TIME: 11.2 SEC (ref 9.1–12)

## 2024-07-18 ENCOUNTER — OFFICE (OUTPATIENT)
Dept: URBAN - METROPOLITAN AREA CLINIC 11 | Facility: CLINIC | Age: 59
End: 2024-07-18

## 2024-07-18 VITALS
OXYGEN SATURATION: 98 % | SYSTOLIC BLOOD PRESSURE: 137 MMHG | DIASTOLIC BLOOD PRESSURE: 83 MMHG | SYSTOLIC BLOOD PRESSURE: 148 MMHG | HEIGHT: 71 IN | HEART RATE: 60 BPM | WEIGHT: 315 LBS

## 2024-07-18 DIAGNOSIS — B18.1 CHRONIC VIRAL HEPATITIS B WITHOUT DELTA-AGENT: ICD-10-CM

## 2024-07-18 DIAGNOSIS — K76.0 FATTY (CHANGE OF) LIVER, NOT ELSEWHERE CLASSIFIED: ICD-10-CM

## 2024-07-18 PROCEDURE — 99214 OFFICE O/P EST MOD 30 MIN: CPT

## 2024-07-20 LAB
CBC, PLATELET, NO DIFFERENTIAL: HEMATOCRIT: 42.8 % (ref 37.5–51)
CBC, PLATELET, NO DIFFERENTIAL: HEMOGLOBIN: 14.2 G/DL (ref 13–17.7)
CBC, PLATELET, NO DIFFERENTIAL: MCH: 31.7 PG (ref 26.6–33)
CBC, PLATELET, NO DIFFERENTIAL: MCHC: 33.2 G/DL (ref 31.5–35.7)
CBC, PLATELET, NO DIFFERENTIAL: MCV: 96 FL (ref 79–97)
CBC, PLATELET, NO DIFFERENTIAL: PLATELETS: 247 X10E3/UL (ref 150–450)
CBC, PLATELET, NO DIFFERENTIAL: RBC: 4.48 X10E6/UL (ref 4.14–5.8)
CBC, PLATELET, NO DIFFERENTIAL: RDW: 12.6 % (ref 11.6–15.4)
CBC, PLATELET, NO DIFFERENTIAL: WBC: 6.9 X10E3/UL (ref 3.4–10.8)
COMP. METABOLIC PANEL (14): ALBUMIN: 4.4 G/DL (ref 3.8–4.9)
COMP. METABOLIC PANEL (14): ALKALINE PHOSPHATASE: 68 IU/L (ref 44–121)
COMP. METABOLIC PANEL (14): ALT (SGPT): 32 IU/L (ref 0–44)
COMP. METABOLIC PANEL (14): AST (SGOT): 33 IU/L (ref 0–40)
COMP. METABOLIC PANEL (14): BILIRUBIN, TOTAL: 0.2 MG/DL (ref 0–1.2)
COMP. METABOLIC PANEL (14): BUN/CREATININE RATIO: 19 (ref 9–20)
COMP. METABOLIC PANEL (14): BUN: 22 MG/DL (ref 6–24)
COMP. METABOLIC PANEL (14): CALCIUM: 9.7 MG/DL (ref 8.7–10.2)
COMP. METABOLIC PANEL (14): CARBON DIOXIDE, TOTAL: 21 MMOL/L (ref 20–29)
COMP. METABOLIC PANEL (14): CHLORIDE: 102 MMOL/L (ref 96–106)
COMP. METABOLIC PANEL (14): CREATININE: 1.13 MG/DL (ref 0.76–1.27)
COMP. METABOLIC PANEL (14): EGFR: 75 ML/MIN/1.73 (ref 59–?)
COMP. METABOLIC PANEL (14): GLOBULIN, TOTAL: 2.4 G/DL (ref 1.5–4.5)
COMP. METABOLIC PANEL (14): GLUCOSE: 187 MG/DL — HIGH (ref 70–99)
COMP. METABOLIC PANEL (14): POTASSIUM: 5.1 MMOL/L (ref 3.5–5.2)
COMP. METABOLIC PANEL (14): PROTEIN, TOTAL: 6.8 G/DL (ref 6–8.5)
COMP. METABOLIC PANEL (14): SODIUM: 138 MMOL/L (ref 134–144)
HBV REAL-TIME PCR, QUANT: HBV IU/ML: <10 IU/ML
HBV REAL-TIME PCR, QUANT: LOG10 HBV IU/ML: (no result) LOG10 IU/ML
HBV REAL-TIME PCR, QUANT: TEST INFORMATION: (no result)
PROTHROMBIN TIME (PT): INR: 1 (ref 0.9–1.2)
PROTHROMBIN TIME (PT): PROTHROMBIN TIME: 10.9 SEC (ref 9.1–12)

## 2025-01-21 ENCOUNTER — OFFICE (OUTPATIENT)
Dept: URBAN - METROPOLITAN AREA CLINIC 11 | Facility: CLINIC | Age: 60
End: 2025-01-21
Payer: COMMERCIAL

## 2025-01-21 VITALS
OXYGEN SATURATION: 98 % | DIASTOLIC BLOOD PRESSURE: 86 MMHG | WEIGHT: 315 LBS | HEART RATE: 71 BPM | HEIGHT: 71 IN | SYSTOLIC BLOOD PRESSURE: 132 MMHG | DIASTOLIC BLOOD PRESSURE: 82 MMHG | SYSTOLIC BLOOD PRESSURE: 155 MMHG

## 2025-01-21 DIAGNOSIS — B18.1 CHRONIC VIRAL HEPATITIS B WITHOUT DELTA-AGENT: ICD-10-CM

## 2025-01-21 DIAGNOSIS — K76.0 FATTY (CHANGE OF) LIVER, NOT ELSEWHERE CLASSIFIED: ICD-10-CM

## 2025-01-21 PROCEDURE — 99213 OFFICE O/P EST LOW 20 MIN: CPT

## 2025-01-21 NOTE — SERVICENOTES
Pathology results provided by the patient on his phone. It seems like it revealed hepatic steatosis with no evidence of Cirrhosis. Will obtain records for Dr. Win's review.

## 2025-01-21 NOTE — SERVICEHPINOTES
Mr. Yuan is a 59 yr old male who presents today for follow up for Hepatitis B. He had a Fibroscan done which was suggestive of possible cirrhosis. He had a liver biopsy at [facility], awaiting pathology report. Patient comes in today for follow up. Overall he has been doing very well since last time he was seen. He denies drinking any alcohol. He had a kidney stone 3 weeks ago which he has passes. He denies any fever, chills, nausea, vomiting, abdominal pain, change in BM, weight loss, loss of appetite, melena, or hematochezia. Next colonoscopy is due .   Previous GI History:brHis brother has hepatitis B and hepatitis C cirrhosis and his mother  of hepatitis B cirrhosis. The patient does not recall when he was diagnosed with the disease but it appears likely that this was many years ago when he was a child or possibly at birth. He was initially seen by us in 2020 and that time lab work revealed normal transaminases with AST 21 and ALT 26. CBC and INR were normal. His hepatitis-B viral load was minimal at 30 IU/mL. Hepatitis-C antibody was negative. HIV was negative. Fibro scan and fibro sure were both consistent F0-F1 fibrosis but did also show evidence of steatosis. Because of low fibrosis, minimal viral load, and normal liver enzymes treatment was not indicated. The patient did undergo screening colonoscopy in 2020 with removal of a small adenomatous polyp and so his next colonoscopy for surveillance is due in . He did enroll in the liver tumor marker research study and did have MRI of the abdomen in 2023 as part of this which revealed fatty liver but was otherwise unremarkable.

## 2025-01-23 LAB
AFP, SERUM, TUMOR MARKER: 2 NG/ML (ref 0–8.4)
CBC, PLATELET, NO DIFFERENTIAL: HEMATOCRIT: 42.7 % (ref 37.5–51)
CBC, PLATELET, NO DIFFERENTIAL: HEMOGLOBIN: 14 G/DL (ref 13–17.7)
CBC, PLATELET, NO DIFFERENTIAL: MCH: 30.4 PG (ref 26.6–33)
CBC, PLATELET, NO DIFFERENTIAL: MCHC: 32.8 G/DL (ref 31.5–35.7)
CBC, PLATELET, NO DIFFERENTIAL: MCV: 93 FL (ref 79–97)
CBC, PLATELET, NO DIFFERENTIAL: PLATELETS: 253 X10E3/UL (ref 150–450)
CBC, PLATELET, NO DIFFERENTIAL: RBC: 4.6 X10E6/UL (ref 4.14–5.8)
CBC, PLATELET, NO DIFFERENTIAL: RDW: 12.6 % (ref 11.6–15.4)
CBC, PLATELET, NO DIFFERENTIAL: WBC: 6.6 X10E3/UL (ref 3.4–10.8)
COMP. METABOLIC PANEL (14): ALBUMIN: 4.3 G/DL (ref 3.8–4.9)
COMP. METABOLIC PANEL (14): ALKALINE PHOSPHATASE: 69 IU/L (ref 44–121)
COMP. METABOLIC PANEL (14): ALT (SGPT): 30 IU/L (ref 0–44)
COMP. METABOLIC PANEL (14): AST (SGOT): 24 IU/L (ref 0–40)
COMP. METABOLIC PANEL (14): BILIRUBIN, TOTAL: 0.2 MG/DL (ref 0–1.2)
COMP. METABOLIC PANEL (14): BUN/CREATININE RATIO: 18 (ref 9–20)
COMP. METABOLIC PANEL (14): BUN: 17 MG/DL (ref 6–24)
COMP. METABOLIC PANEL (14): CALCIUM: 9.6 MG/DL (ref 8.7–10.2)
COMP. METABOLIC PANEL (14): CARBON DIOXIDE, TOTAL: 22 MMOL/L (ref 20–29)
COMP. METABOLIC PANEL (14): CHLORIDE: 103 MMOL/L (ref 96–106)
COMP. METABOLIC PANEL (14): CREATININE: 0.93 MG/DL (ref 0.76–1.27)
COMP. METABOLIC PANEL (14): EGFR: 95 ML/MIN/1.73 (ref 59–?)
COMP. METABOLIC PANEL (14): GLOBULIN, TOTAL: 2.8 G/DL (ref 1.5–4.5)
COMP. METABOLIC PANEL (14): GLUCOSE: 159 MG/DL — HIGH (ref 70–99)
COMP. METABOLIC PANEL (14): POTASSIUM: 4.6 MMOL/L (ref 3.5–5.2)
COMP. METABOLIC PANEL (14): PROTEIN, TOTAL: 7.1 G/DL (ref 6–8.5)
COMP. METABOLIC PANEL (14): SODIUM: 137 MMOL/L (ref 134–144)
HBV REAL-TIME PCR, QUANT: HBV IU/ML: <10 IU/ML
HBV REAL-TIME PCR, QUANT: LOG10 HBV IU/ML: (no result) LOG10 IU/ML
HBV REAL-TIME PCR, QUANT: TEST INFORMATION: (no result)
PROTHROMBIN TIME (PT): INR: 1 (ref 0.9–1.2)
PROTHROMBIN TIME (PT): PROTHROMBIN TIME: 11.3 SEC (ref 9.1–12)

## 2025-07-21 ENCOUNTER — OFFICE (OUTPATIENT)
Dept: URBAN - METROPOLITAN AREA CLINIC 11 | Facility: CLINIC | Age: 60
End: 2025-07-21
Payer: COMMERCIAL

## 2025-07-21 VITALS
DIASTOLIC BLOOD PRESSURE: 83 MMHG | WEIGHT: 315 LBS | DIASTOLIC BLOOD PRESSURE: 65 MMHG | HEART RATE: 45 BPM | SYSTOLIC BLOOD PRESSURE: 152 MMHG | OXYGEN SATURATION: 99 % | SYSTOLIC BLOOD PRESSURE: 148 MMHG | HEIGHT: 71 IN

## 2025-07-21 DIAGNOSIS — B18.1 CHRONIC VIRAL HEPATITIS B WITHOUT DELTA-AGENT: ICD-10-CM

## 2025-07-21 DIAGNOSIS — Z86.0100 PERSONAL HISTORY OF COLON POLYPS, UNSPECIFIED: ICD-10-CM

## 2025-07-21 PROCEDURE — 99214 OFFICE O/P EST MOD 30 MIN: CPT

## 2025-07-21 RX ORDER — SODIUM SULFATE, POTASSIUM SULFATE, MAGNESIUM SULFATE 1.6; 3.13; 17.5 G/ML; G/ML; G/ML
SOLUTION, CONCENTRATE ORAL
Qty: 1 | Refills: 0 | Status: ACTIVE
Start: 2025-07-21